# Patient Record
Sex: FEMALE | Race: BLACK OR AFRICAN AMERICAN
[De-identification: names, ages, dates, MRNs, and addresses within clinical notes are randomized per-mention and may not be internally consistent; named-entity substitution may affect disease eponyms.]

---

## 2018-05-18 ENCOUNTER — HOSPITAL ENCOUNTER (OUTPATIENT)
Dept: HOSPITAL 74 - FASU | Age: 53
Discharge: HOME | End: 2018-05-18
Attending: ORTHOPAEDIC SURGERY
Payer: COMMERCIAL

## 2018-05-18 VITALS — HEART RATE: 66 BPM | DIASTOLIC BLOOD PRESSURE: 75 MMHG | SYSTOLIC BLOOD PRESSURE: 134 MMHG

## 2018-05-18 VITALS — BODY MASS INDEX: 26.2 KG/M2

## 2018-05-18 VITALS — TEMPERATURE: 97.7 F
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
## 2018-05-18 DIAGNOSIS — M65.861: ICD-10-CM

## 2018-05-18 DIAGNOSIS — S83.281A: ICD-10-CM

## 2018-05-18 DIAGNOSIS — S83.241A: Primary | ICD-10-CM
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
## 2018-05-18 DIAGNOSIS — S83.8X1A: ICD-10-CM
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
## 2018-05-18 PROCEDURE — 0SBC4ZZ EXCISION OF RIGHT KNEE JOINT, PERCUTANEOUS ENDOSCOPIC APPROACH: ICD-10-PCS | Performed by: ORTHOPAEDIC SURGERY

## 2018-05-20 NOTE — OP
DATE OF OPERATION:  05/18/2018

 

SURGEON: Matt Acosta MD

 

ASSISTANT:  MIKE Camejo

 

PREOPERATIVE DIAGNOSIS:  

1.  Right knee medial and lateral meniscal tears. 

2.  Right knee cartilage injury. 

3.  Right knee synovitis. 

 

POSTOPERATIVE DIAGNOSIS:    

1.  Right knee medial and lateral meniscal tears. 

2.  Right knee cartilage injury. 

3.  Right knee synovitis. 

 

PROCEDURE:   

1.  Right knee arthroscopy with partial meniscectomy of the medial and lateral

meniscus, CPT code 87993.

2.  Right knee arthroscopy with chondroplasty and abrasioplasty, CPT code 63777.

3.  Right knee arthroscopy with synovectomy, CPT code 35017.

 

FINDINGS:  

1.  Medial meniscus body and posterior horn tear.

2.  Lateral meniscus body tear. 

3.  Synovitis of patellofemoral, medial and lateral notch area.  

4.  Anterior grade 1 cartilage change, anterior tibia medial joint line. 

5.  ACL and PCL intact. 

6.  Diffuse grade 1-2 cartilage changes of the lateral tibial plateau.  

7.  Central grade 2-4 cartilage injury of the patella with large cartilage flap.

 

PROCEDURE:  Informed consent was obtained.  The patient came to the operating room,

where the lower extremity was prepped and draped in a sterile fashion.  A tourniquet

was placed on the upper thigh, but not inflated. 

 

Using standard arthroscopic technique, a lateral incision and portal was made to

allow for introduction of the camera into the suprapatellar bursa.  This was then

taken to the medial joint line, where under direct visualization, a medial incision

and portal was made.

 

Excessive synovium noted in the medial, lateral and patellofemoral and notch area was

removed by an up-biter, shaver and Bovie cautery.  This was found to bring in

inflammatory tissue into the joint surface, a source of pain and dysfunction. 

 

Probing of the medial and lateral meniscus found tears, as described in the findings.

 These were removed with the up-biter and shaver and taken back to a stable rim. 

 

Grade 2 to 3 degenerative changes were treated with a chondroplasty, removing all

flaking surfaces with low-setting Bovie along the periphery to prevent further

flaking.  

 

Grade 4 changes, as noted, were treated with an abrasioplasty, creating a bleeding

surface at the bone/cartilage interface. Aggressive debridement with shaver/sweetie

created bleeding surface.  Microfracture also done when indicated in findings

 

All areas of the knee were once again reexamined.  The knee was then drained and a

single suture was placed in all portals.  A sterile dressing was placed and the

patient was transferred to the recovery room without complication. 

 

 

MATT ACOSTA M.D.

 

JOSE [id number]

DD: 05/20/2018 22:28

DT: 05/20/2018 22:40

Job #:  [id number]

## 2018-05-21 NOTE — PATH
Surgical Pathology Report



Patient Name:  CORWIN CORRALES

Accession #:  

Med. Rec. #:  [id number]                                                        

   /Age/Gender:  1965 (Age: 53) / F

Account:  [id number]                                                          

             Location: [facility] AMBULATORY 

Taken:  2018

Received:  2018

Reported:  2018

Physicians:  Matt Patel M.D.

  



Specimen(s) Received

 RIGHT KNEE SHAVINGS 





Clinical History

Right knee internal derangement







Final Diagnosis

KNEE SHAVINGS, RIGHT, ARTHROSCOPY, PARTIAL MENISCECTOMY, CHONDROPLASTY,

SYNOVECTOMY:

FRAGMENTS OF CARTILAGE, DENSE FIBROCONNECTIVE TISSUE, ADIPOSE TISSUE, AND

SYNOVIUM.





***Electronically Signed***

Roxann Wilson M.D.





Gross Description

Received in formalin, labeled "right knee shaving," is a 2.5 x 2.5 x 0.3 cm.

aggregate of tan-yellow soft tissue fragments. A representative portion is

submitted in one cassette.

/2018



[facility]

## 2020-11-10 ENCOUNTER — HOSPITAL ENCOUNTER (EMERGENCY)
Dept: HOSPITAL 74 - JER | Age: 55
Discharge: HOME | End: 2020-11-10
Payer: SELF-PAY

## 2020-11-10 VITALS — HEART RATE: 82 BPM | TEMPERATURE: 98.2 F | DIASTOLIC BLOOD PRESSURE: 84 MMHG | SYSTOLIC BLOOD PRESSURE: 154 MMHG

## 2020-11-10 VITALS — BODY MASS INDEX: 26.9 KG/M2

## 2020-11-10 DIAGNOSIS — K04.7: Primary | ICD-10-CM

## 2020-11-10 LAB
ALBUMIN SERPL-MCNC: 4 G/DL (ref 3.4–5)
ALP SERPL-CCNC: 98 U/L (ref 45–117)
ALT SERPL-CCNC: 25 U/L (ref 13–61)
ANION GAP SERPL CALC-SCNC: 6 MMOL/L (ref 8–16)
AST SERPL-CCNC: 17 U/L (ref 15–37)
BASOPHILS # BLD: 0.7 % (ref 0–2)
BILIRUB SERPL-MCNC: 0.6 MG/DL (ref 0.2–1)
BUN SERPL-MCNC: 16.8 MG/DL (ref 7–18)
CALCIUM SERPL-MCNC: 9.8 MG/DL (ref 8.5–10.1)
CHLORIDE SERPL-SCNC: 105 MMOL/L (ref 98–107)
CO2 SERPL-SCNC: 27 MMOL/L (ref 21–32)
CREAT SERPL-MCNC: 1 MG/DL (ref 0.55–1.3)
DEPRECATED RDW RBC AUTO: 14.7 % (ref 11.6–15.6)
EOSINOPHIL # BLD: 0.7 % (ref 0–4.5)
GLUCOSE SERPL-MCNC: 94 MG/DL (ref 74–106)
HCT VFR BLD CALC: 40.8 % (ref 32.4–45.2)
HGB BLD-MCNC: 13.4 GM/DL (ref 10.7–15.3)
INR BLD: 0.9 (ref 0.83–1.09)
LYMPHOCYTES # BLD: 14.6 % (ref 8–40)
MCH RBC QN AUTO: 29.8 PG (ref 25.7–33.7)
MCHC RBC AUTO-ENTMCNC: 32.8 G/DL (ref 32–36)
MCV RBC: 90.8 FL (ref 80–96)
MONOCYTES # BLD AUTO: 8.7 % (ref 3.8–10.2)
NEUTROPHILS # BLD: 75.3 % (ref 42.8–82.8)
PLATELET # BLD AUTO: 389 K/MM3 (ref 134–434)
PMV BLD: 7.8 FL (ref 7.5–11.1)
POTASSIUM SERPLBLD-SCNC: 4.2 MMOL/L (ref 3.5–5.1)
PROT SERPL-MCNC: 7.8 G/DL (ref 6.4–8.2)
PT PNL PPP: 10.9 SEC (ref 9.7–13)
RBC # BLD AUTO: 4.49 M/MM3 (ref 3.6–5.2)
SODIUM SERPL-SCNC: 138 MMOL/L (ref 136–145)
WBC # BLD AUTO: 9.2 K/MM3 (ref 4–10)